# Patient Record
Sex: MALE | Race: WHITE | HISPANIC OR LATINO | Employment: FULL TIME | URBAN - NONMETROPOLITAN AREA
[De-identification: names, ages, dates, MRNs, and addresses within clinical notes are randomized per-mention and may not be internally consistent; named-entity substitution may affect disease eponyms.]

---

## 2023-09-27 ENCOUNTER — OFFICE VISIT (OUTPATIENT)
Dept: MEDICAL GROUP | Facility: PHYSICIAN GROUP | Age: 28
End: 2023-09-27
Payer: COMMERCIAL

## 2023-09-27 VITALS
DIASTOLIC BLOOD PRESSURE: 78 MMHG | SYSTOLIC BLOOD PRESSURE: 116 MMHG | BODY MASS INDEX: 29.98 KG/M2 | WEIGHT: 209.44 LBS | TEMPERATURE: 97.5 F | OXYGEN SATURATION: 95 % | HEIGHT: 70 IN | RESPIRATION RATE: 12 BRPM | HEART RATE: 77 BPM

## 2023-09-27 DIAGNOSIS — Z23 NEED FOR VACCINATION: ICD-10-CM

## 2023-09-27 DIAGNOSIS — Z11.59 NEED FOR HEPATITIS C SCREENING TEST: ICD-10-CM

## 2023-09-27 DIAGNOSIS — Z00.00 ANNUAL PHYSICAL EXAM: ICD-10-CM

## 2023-09-27 DIAGNOSIS — Z11.59 NEED FOR HEPATITIS B SCREENING TEST: ICD-10-CM

## 2023-09-27 DIAGNOSIS — R82.998 DARK BROWN URINE: ICD-10-CM

## 2023-09-27 PROCEDURE — 3074F SYST BP LT 130 MM HG: CPT | Performed by: STUDENT IN AN ORGANIZED HEALTH CARE EDUCATION/TRAINING PROGRAM

## 2023-09-27 PROCEDURE — 3078F DIAST BP <80 MM HG: CPT | Performed by: STUDENT IN AN ORGANIZED HEALTH CARE EDUCATION/TRAINING PROGRAM

## 2023-09-27 PROCEDURE — 90686 IIV4 VACC NO PRSV 0.5 ML IM: CPT | Performed by: STUDENT IN AN ORGANIZED HEALTH CARE EDUCATION/TRAINING PROGRAM

## 2023-09-27 PROCEDURE — 99385 PREV VISIT NEW AGE 18-39: CPT | Mod: 25 | Performed by: STUDENT IN AN ORGANIZED HEALTH CARE EDUCATION/TRAINING PROGRAM

## 2023-09-27 PROCEDURE — 90471 IMMUNIZATION ADMIN: CPT | Performed by: STUDENT IN AN ORGANIZED HEALTH CARE EDUCATION/TRAINING PROGRAM

## 2023-09-27 ASSESSMENT — ENCOUNTER SYMPTOMS
DIZZINESS: 0
CHILLS: 0
VOMITING: 0
PALPITATIONS: 0
HEADACHES: 0
COUGH: 0
FEVER: 0
ABDOMINAL PAIN: 0
ORTHOPNEA: 0
FOCAL WEAKNESS: 0
NAUSEA: 0
SHORTNESS OF BREATH: 0
WHEEZING: 0

## 2023-09-27 ASSESSMENT — PATIENT HEALTH QUESTIONNAIRE - PHQ9: CLINICAL INTERPRETATION OF PHQ2 SCORE: 0

## 2023-09-27 NOTE — PROGRESS NOTES
Subjective:   HISTORY OF THE PRESENT ILLNESS: Patient is a 28 y.o. male here today to establish care.    Problem   Dark Brown Urine    Thony is here to establish care.  He has no concerns today.  On review of systems reports that he has been having some dark brown urine occasionally.  Denies any blood or pinkish tint.  Reports that he does drink lot of water and yet still has this.  Reports never having history of nephrolithiasis.  Denies any dysuria, fevers, flank pain, abdominal pain nausea vomiting.            Review of systems:  Review of Systems   Constitutional:  Negative for chills and fever.   Respiratory:  Negative for cough, shortness of breath and wheezing.    Cardiovascular:  Negative for chest pain, palpitations, orthopnea and leg swelling.   Gastrointestinal:  Negative for abdominal pain, nausea and vomiting.   Genitourinary:  Negative for dysuria, frequency and hematuria.   Musculoskeletal:  Negative for joint pain.   Neurological:  Negative for dizziness, focal weakness and headaches.         Patient Active Problem List    Diagnosis Date Noted    Dark brown urine 09/27/2023     History reviewed. No pertinent surgical history.  Social History     Tobacco Use    Smoking status: Never    Smokeless tobacco: Never   Vaping Use    Vaping Use: Never used   Substance Use Topics    Alcohol use: Yes     Alcohol/week: 9.0 oz     Types: 15 Cans of beer per week    Drug use: Yes     Types: Marijuana, Inhaled      Family History   Problem Relation Age of Onset    Hyperlipidemia Mother     Cancer Father         prostate cancer, skin cancer, kidney cancer    No Known Problems Sister     Cancer Brother 26        testicular cancer     No current outpatient medications on file.     No current facility-administered medications for this visit.       Allergies:  Allergies   Allergen Reactions    Seasonal        Allergies, past medical history, past surgical history, family history, social history reviewed and  "updated.    Objective:    /78   Pulse 77   Temp 36.4 °C (97.5 °F) (Temporal)   Resp 12   Ht 1.778 m (5' 10\")   Wt 95 kg (209 lb 7 oz)   SpO2 95%   BMI 30.05 kg/m²    Body mass index is 30.05 kg/m².    Physical exam:  General: Normal appearance, no acute distress, not ill-appearing  HEENT: EOM intact, conjunctiva normal limits, negative right/left eye discharge.  Sclera anicteric, TM within normal limits bilaterally  Cardiovascular: Normal rate and rhythm, no murmurs  Pulmonary: No respiratory distress, no wheezing, no rales, breath sounds normal.  Abdomen: Bowel sounds normal, flat, soft.  Musculoskeletal: No edema bilaterally  Skin: Warm, dry, no lesions  Neurological: No focal deficits, normal gait  Psychiatric: Mood within normal limits    Assessment/Plan:    Patient here for a preventive medicine visit today and to establish care.  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  -Cholesterol screening: Ordered  -Diabetes screening: Fasting glucose ordered    Immunizations/Infectious disease:  STI screening: declines  Safe sex practices discusssed  HIV screening: declines  Immunizations: Flu shot today    Cancer screenings:  Colorectal cancer screening: No family history      Problem List Items Addressed This Visit       Dark brown urine     Urinalysis ordered         Relevant Orders    URINALYSIS     Other Visit Diagnoses       Annual physical exam        Relevant Orders    Comp Metabolic Panel    CBC WITHOUT DIFFERENTIAL    Lipid Profile    Need for hepatitis C screening test        Relevant Orders    HEP C VIRUS ANTIBODY    Need for hepatitis B screening test        Relevant Orders    HEP B SURFACE AB    Need for vaccination        Relevant Orders    INFLUENZA VACCINE QUAD INJ " (PF) (Completed)           Patient Counseling:  --Discussed moderation in caloric intake, sufficient fresh fruits/vegetables, fiber, iron  --Discussed brushing, flossing, and dental visits.   --Encouraged regular exercise.   --Discussed tobacco, alcohol, or other drug use.  --Discussed sexually transmitted infections, partner selection, use of condoms  --Injury prevention: Discussed     Preventive visit in 1 year, sooner as needed for any concerns.     Return in about 1 year (around 9/27/2024), or if symptoms worsen or fail to improve, for labs.

## 2023-10-13 ENCOUNTER — OFFICE VISIT (OUTPATIENT)
Dept: MEDICAL GROUP | Facility: PHYSICIAN GROUP | Age: 28
End: 2023-10-13
Payer: COMMERCIAL

## 2023-10-13 VITALS
SYSTOLIC BLOOD PRESSURE: 104 MMHG | HEART RATE: 72 BPM | HEIGHT: 70 IN | BODY MASS INDEX: 29.83 KG/M2 | RESPIRATION RATE: 16 BRPM | DIASTOLIC BLOOD PRESSURE: 80 MMHG | OXYGEN SATURATION: 97 % | TEMPERATURE: 97.5 F | WEIGHT: 208.34 LBS

## 2023-10-13 DIAGNOSIS — R82.998 DARK BROWN URINE: ICD-10-CM

## 2023-10-13 DIAGNOSIS — R74.8 ELEVATED LIVER ENZYMES: ICD-10-CM

## 2023-10-13 DIAGNOSIS — E78.01 FAMILIAL HYPERCHOLESTEROLEMIA: ICD-10-CM

## 2023-10-13 PROCEDURE — 3074F SYST BP LT 130 MM HG: CPT | Performed by: STUDENT IN AN ORGANIZED HEALTH CARE EDUCATION/TRAINING PROGRAM

## 2023-10-13 PROCEDURE — 99214 OFFICE O/P EST MOD 30 MIN: CPT | Performed by: STUDENT IN AN ORGANIZED HEALTH CARE EDUCATION/TRAINING PROGRAM

## 2023-10-13 PROCEDURE — 3079F DIAST BP 80-89 MM HG: CPT | Performed by: STUDENT IN AN ORGANIZED HEALTH CARE EDUCATION/TRAINING PROGRAM

## 2023-10-13 RX ORDER — ATORVASTATIN CALCIUM 10 MG/1
10 TABLET, FILM COATED ORAL NIGHTLY
Qty: 90 TABLET | Refills: 2 | Status: SHIPPED | OUTPATIENT
Start: 2023-10-13

## 2023-10-13 ASSESSMENT — ENCOUNTER SYMPTOMS
ORTHOPNEA: 0
CONSTIPATION: 0
CHILLS: 0
FLANK PAIN: 0
DIARRHEA: 0
FEVER: 0
HEARTBURN: 0
VOMITING: 0
PALPITATIONS: 0
COUGH: 0
HEADACHES: 0
ABDOMINAL PAIN: 0
NAUSEA: 0
FOCAL WEAKNESS: 0
SHORTNESS OF BREATH: 0
DIZZINESS: 0
BLOOD IN STOOL: 0
WHEEZING: 0

## 2023-10-13 NOTE — PROGRESS NOTES
HISTORY OF PRESENT ILLNESS: Thony is a pleasant 28 y.o. male, established patient who presents today to discuss medical problems as listed below:    Problem   Familial Hypercholesterolemia    Patient here to review labs.  Particularly his lipid panel.  He does have a strong family history of hyperlipidemia on his mother side.  Denies any chest pain, shortness of breath, dizziness, headaches.  Reports that he eats well for the most part and tries to exercise weekly.     Elevated Liver Enzymes   Dark Brown Urine        Current Outpatient Medications Ordered in Epic   Medication Sig Dispense Refill    atorvastatin (LIPITOR) 10 MG Tab Take 1 Tablet by mouth every evening. 90 Tablet 2     No current Epic-ordered facility-administered medications on file.       Review of systems:  Review of Systems   Constitutional:  Negative for chills and fever.   Respiratory:  Negative for cough, shortness of breath and wheezing.    Cardiovascular:  Negative for chest pain, palpitations, orthopnea and leg swelling.   Gastrointestinal:  Negative for abdominal pain, blood in stool, constipation, diarrhea, heartburn, melena, nausea and vomiting.   Genitourinary:  Negative for dysuria, flank pain, frequency, hematuria and urgency.   Musculoskeletal:  Negative for joint pain.   Neurological:  Negative for dizziness, focal weakness and headaches.           Patient Active Problem List    Diagnosis Date Noted    Familial hypercholesterolemia 10/13/2023    Elevated liver enzymes 10/13/2023    Dark brown urine 09/27/2023     History reviewed. No pertinent surgical history.  Social History     Tobacco Use    Smoking status: Never    Smokeless tobacco: Never   Vaping Use    Vaping Use: Never used   Substance Use Topics    Alcohol use: Yes     Alcohol/week: 9.0 oz     Types: 15 Cans of beer per week    Drug use: Yes     Types: Marijuana, Inhaled      Family History   Problem Relation Age of Onset    Hyperlipidemia Mother     Cancer Father         " prostate cancer, skin cancer, kidney cancer    No Known Problems Sister     Cancer Brother 26        testicular cancer     Current Outpatient Medications   Medication Sig Dispense Refill    atorvastatin (LIPITOR) 10 MG Tab Take 1 Tablet by mouth every evening. 90 Tablet 2     No current facility-administered medications for this visit.       Allergies:  Allergies   Allergen Reactions    Seasonal        Allergies, past medical history, past surgical history, family history, social history reviewed and updated.    Objective:    /80 (BP Location: Left arm, Patient Position: Sitting, BP Cuff Size: Adult)   Pulse 72   Temp 36.4 °C (97.5 °F) (Temporal)   Resp 16   Ht 1.778 m (5' 10\")   Wt 94.5 kg (208 lb 5.4 oz)   SpO2 97%   BMI 29.89 kg/m²    Body mass index is 29.89 kg/m².    Physical exam:  General: Normal appearance, no acute distress, not ill-appearing  HEENT: EOM intact, conjunctiva normal limits, negative right/left eye discharge.  Sclera anicteric  Cardiovascular: Normal rate and rhythm, no murmurs  Pulmonary: No respiratory distress, no wheezing, no rales, breath sounds normal.  Musculoskeletal: No edema bilaterally  Abd: no tenderness no masses  Skin: Warm, dry, no lesions  Neurological: No focal deficits, normal gait  Psychiatric: Mood within normal limits    Assessment/Plan:    Problem List Items Addressed This Visit       Dark brown urine     Urinalysis was clear yellow, no hematuria, no proteinuria no signs of infection.         Familial hypercholesterolemia     LDL greater than 190, suspect familial hypercholesterolemia.  We will order this testing for this.  start him on atorvastatin 10 mg daily.  Return to care 6 months for repeat lipid panel         Relevant Medications    atorvastatin (LIPITOR) 10 MG Tab    Other Relevant Orders    Referral to Genetic Research Studies    Lipoprotein (a)    Elevated liver enzymes     Elevated liver enzymes    Reorder with hep b in one month. Hep C " neg  Consider ruq if still elevated. He does minimal etoh use.          Relevant Orders    HEP B SURFACE ANTIGEN    Comp Metabolic Panel        Return in about 6 months (around 4/13/2024) for labs.

## 2023-10-13 NOTE — ASSESSMENT & PLAN NOTE
Elevated liver enzymes    Reorder with hep b in one month. Hep C neg  Consider ruq if still elevated. He does minimal etoh use.

## 2023-10-13 NOTE — ASSESSMENT & PLAN NOTE
LDL greater than 190, suspect familial hypercholesterolemia.  We will order this testing for this.  start him on atorvastatin 10 mg daily.  Return to care 6 months for repeat lipid panel

## 2023-10-18 ENCOUNTER — RESEARCH ENCOUNTER (OUTPATIENT)
Dept: RESEARCH | Facility: MEDICAL CENTER | Age: 28
End: 2023-10-18
Attending: STUDENT IN AN ORGANIZED HEALTH CARE EDUCATION/TRAINING PROGRAM
Payer: COMMERCIAL

## 2023-10-18 DIAGNOSIS — Z00.6 RESEARCH STUDY PATIENT: ICD-10-CM

## 2023-10-18 NOTE — RESEARCH NOTE
Confirmed with the participant which designated provider they would like study results shared with. Patient will have an opportunity to share the results with any providers of their choosing in the future by accessing their results from [facility].

## 2023-10-20 ENCOUNTER — HOSPITAL ENCOUNTER (OUTPATIENT)
Dept: LAB | Facility: MEDICAL CENTER | Age: 28
End: 2023-10-20
Attending: STUDENT IN AN ORGANIZED HEALTH CARE EDUCATION/TRAINING PROGRAM
Payer: COMMERCIAL

## 2023-10-20 DIAGNOSIS — Z00.6 RESEARCH STUDY PATIENT: ICD-10-CM

## 2023-11-26 LAB
APOB+LDLR+PCSK9 GENE MUT ANL BLD/T: NOT DETECTED
BRCA1+BRCA2 DEL+DUP + FULL MUT ANL BLD/T: NOT DETECTED
MLH1+MSH2+MSH6+PMS2 GN DEL+DUP+FUL M: NOT DETECTED

## 2023-12-10 ENCOUNTER — OFFICE VISIT (OUTPATIENT)
Dept: URGENT CARE | Facility: CLINIC | Age: 28
End: 2023-12-10
Payer: COMMERCIAL

## 2023-12-10 ENCOUNTER — APPOINTMENT (OUTPATIENT)
Dept: RADIOLOGY | Facility: IMAGING CENTER | Age: 28
End: 2023-12-10
Attending: NURSE PRACTITIONER
Payer: COMMERCIAL

## 2023-12-10 VITALS
SYSTOLIC BLOOD PRESSURE: 110 MMHG | HEART RATE: 80 BPM | BODY MASS INDEX: 29.78 KG/M2 | OXYGEN SATURATION: 97 % | RESPIRATION RATE: 16 BRPM | WEIGHT: 208 LBS | TEMPERATURE: 98.5 F | DIASTOLIC BLOOD PRESSURE: 84 MMHG | HEIGHT: 70 IN

## 2023-12-10 DIAGNOSIS — M89.8X1 PAIN OF LEFT CLAVICLE: ICD-10-CM
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
## 2023-12-10 PROCEDURE — 73000 X-RAY EXAM OF COLLAR BONE: CPT | Mod: TC,LT | Performed by: NURSE PRACTITIONER

## 2023-12-10 PROCEDURE — 3074F SYST BP LT 130 MM HG: CPT | Performed by: NURSE PRACTITIONER

## 2023-12-10 PROCEDURE — 3079F DIAST BP 80-89 MM HG: CPT | Performed by: NURSE PRACTITIONER

## 2023-12-10 PROCEDURE — 99213 OFFICE O/P EST LOW 20 MIN: CPT | Performed by: NURSE PRACTITIONER

## 2023-12-10 ASSESSMENT — ENCOUNTER SYMPTOMS
CARDIOVASCULAR NEGATIVE: 1
CONSTITUTIONAL NEGATIVE: 1
EYES NEGATIVE: 1
NEUROLOGICAL NEGATIVE: 1
RESPIRATORY NEGATIVE: 1
GASTROINTESTINAL NEGATIVE: 1
TINGLING: 0
MUSCLE WEAKNESS: 0
NUMBNESS: 0

## 2023-12-11 ENCOUNTER — OFFICE VISIT (OUTPATIENT)
Dept: URGENT CARE | Facility: CLINIC | Age: 28
End: 2023-12-11
Payer: COMMERCIAL

## 2023-12-11 VITALS
HEART RATE: 79 BPM | RESPIRATION RATE: 16 BRPM | OXYGEN SATURATION: 99 % | HEIGHT: 70 IN | TEMPERATURE: 98 F | SYSTOLIC BLOOD PRESSURE: 128 MMHG | BODY MASS INDEX: 29.78 KG/M2 | WEIGHT: 208 LBS | DIASTOLIC BLOOD PRESSURE: 80 MMHG

## 2023-12-11 DIAGNOSIS — S46.009A ROTATOR CUFF INJURY, INITIAL ENCOUNTER: ICD-10-CM

## 2023-12-11 PROCEDURE — 99213 OFFICE O/P EST LOW 20 MIN: CPT | Performed by: REGISTERED NURSE

## 2023-12-11 PROCEDURE — 3074F SYST BP LT 130 MM HG: CPT | Performed by: REGISTERED NURSE

## 2023-12-11 PROCEDURE — 3079F DIAST BP 80-89 MM HG: CPT | Performed by: REGISTERED NURSE

## 2023-12-11 RX ORDER — KETOROLAC TROMETHAMINE 30 MG/ML
30 INJECTION, SOLUTION INTRAMUSCULAR; INTRAVENOUS ONCE
Status: DISCONTINUED | OUTPATIENT
Start: 2023-12-11 | End: 2023-12-11

## 2023-12-11 RX ORDER — KETOROLAC TROMETHAMINE 30 MG/ML
30 INJECTION, SOLUTION INTRAMUSCULAR; INTRAVENOUS ONCE
Status: COMPLETED | OUTPATIENT
Start: 2023-12-11 | End: 2023-12-11

## 2023-12-11 RX ADMIN — KETOROLAC TROMETHAMINE 30 MG: 30 INJECTION, SOLUTION INTRAMUSCULAR; INTRAVENOUS at 13:53

## 2023-12-11 NOTE — PROGRESS NOTES
"Subjective:   Thony Medley is a 28 y.o. male who presents for Shoulder Injury (L shoulder, collar bone, pt states was snow boarding, fell x today )      Patient presents for evaluation of left clavicle pain s/p fall while snowboarding earlier today.  He states that he did hear a \"pop\" and then had onset of pain in the left clavicle. He states that he has worsening pain with any movement of his left arm.  He describes the pain as aching in quality and it is constant. He did apply ice and is keeping his left arm immobile to help with the pain.  He denies any numbness or tingling. He denies any radiation of the pain anywhere else.     Shoulder Injury   Incident location: [place] while snowboarding. The incident occurred 3 to 6 hours ago. The injury mechanism was a fall. The quality of the pain is described as aching and stabbing. Radiates to: Left clavicle. The pain is at a severity of 6/10. The pain is moderate. Pertinent negatives include no chest pain, muscle weakness, numbness or tingling. The symptoms are aggravated by movement, overhead lifting and palpation. He has tried ice and immobilization for the symptoms. The treatment provided mild relief.       Review of Systems   Constitutional: Negative.    HENT: Negative.     Eyes: Negative.    Respiratory: Negative.     Cardiovascular: Negative.  Negative for chest pain.   Gastrointestinal: Negative.    Genitourinary: Negative.    Musculoskeletal:         Left clavicle pain   Skin: Negative.    Neurological: Negative.  Negative for tingling and numbness.       Medications, Allergies, and current problem list reviewed today in Epic.     Objective:     /84 (BP Location: Right arm, Patient Position: Sitting, BP Cuff Size: Adult)   Pulse 80   Temp 36.9 °C (98.5 °F) (Temporal)   Resp 16   Ht 1.778 m (5' 10\")   Wt 94.3 kg (208 lb)   SpO2 97%     Physical Exam  Vitals reviewed.   Constitutional:       Appearance: Normal appearance.   HENT:      Head: " Normocephalic and atraumatic.      Nose: Nose normal.      Mouth/Throat:      Mouth: Mucous membranes are moist.      Pharynx: Oropharynx is clear.   Eyes:      Extraocular Movements: Extraocular movements intact.      Conjunctiva/sclera: Conjunctivae normal.      Pupils: Pupils are equal, round, and reactive to light.   Cardiovascular:      Rate and Rhythm: Normal rate and regular rhythm.      Pulses: Normal pulses.      Heart sounds: Normal heart sounds.   Pulmonary:      Effort: Pulmonary effort is normal.      Breath sounds: Normal breath sounds.   Chest:          Comments: There is pain to palpation over the left clavicle.  Patient cannot move his left arm due to pain.  There is no erythema, edema or ecchymosis.  There is no deformity palpable.    Abdominal:      General: Abdomen is flat. Bowel sounds are normal.      Palpations: Abdomen is soft.   Musculoskeletal:         General: Normal range of motion.      Cervical back: Normal range of motion and neck supple.   Skin:     General: Skin is warm and dry.      Capillary Refill: Capillary refill takes less than 2 seconds.   Neurological:      General: No focal deficit present.      Mental Status: He is alert and oriented to person, place, and time.   Psychiatric:         Mood and Affect: Mood normal.         Behavior: Behavior normal.         RADIOLOGY RESULTS   DX-CLAVICLE LEFT    Result Date: 12/10/2023  12/10/2023 1:56 PM HISTORY/REASON FOR EXAM:  Pain following trauma. TECHNIQUE/EXAM DESCRIPTION AND NUMBER OF VIEWS:  2 views of the LEFT clavicle. COMPARISON: None FINDINGS: BONE MINERALIZATION: Normal. JOINTS: Preserved. No erosions. FRACTURE: None. DISLOCATION: None. SOFT TISSUES: No mass.     No acute osseous abnormality.             Assessment/Plan:     Diagnosis and associated orders:     1. Snowboard accident, initial encounter  DX-CLAVICLE LEFT      2. Pain of left clavicle  DX-CLAVICLE LEFT         Comments/MDM:     Patient was reassured that at this  time his x-rays do not reveal any fracture or dislocation.  Due to his pain level, he was placed in a sling for comfort today and referred to orthopedics.  He will apply ice to the area and use antiinflammatories for pain.  He will return to clinic for any worsening symptoms, but otherwise will follow up with orthopedics  at the first available appointment. Patient verbalizes understanding and is in agreement with the treatment plan.         Differential diagnosis, natural history, supportive care, and indications for immediate follow-up discussed.    Advised the patient to follow-up with the primary care physician for recheck, reevaluation, and consideration of further management.    Please note that this dictation was created using voice recognition software. I have made a reasonable attempt to correct obvious errors, but I expect that there are errors of grammar and possibly content that I did not discover before finalizing the note.    This note was electronically signed by DIONY Mejia

## 2023-12-11 NOTE — PROGRESS NOTES
"Subjective:   Thony Medlye is a 28 y.o. male who presents for Shoulder Injury (Unable to lifted, x 1 day )    HPI  Was seen in clinic yesterday for left shoulder injury, reports worsening pain difficulty with lateral abduction.  No numbness or tingling.  Did have negative imaging yesterday.  Not using OTC medications.  Does have referral for orthopedics but wondering if it can get pushed forward.  No new trauma or injury.    ROS: Negative unless mentioned in HPI    Allergies   Allergen Reactions    Seasonal        Patient Active Problem List    Diagnosis Date Noted    Familial hypercholesterolemia 10/13/2023    Elevated liver enzymes 10/13/2023    Dark brown urine 09/27/2023       Current Outpatient Medications Ordered in Epic   Medication Sig Dispense Refill    atorvastatin (LIPITOR) 10 MG Tab Take 1 Tablet by mouth every evening. (Patient not taking: Reported on 12/10/2023) 90 Tablet 2     Current Facility-Administered Medications Ordered in Epic   Medication Dose Route Frequency Provider Last Rate Last Admin    ketorolac (Toradol) injection 30 mg  30 mg Intramuscular Once Kavon Reyna, A.P.R.N.           No past surgical history on file.    Social History     Tobacco Use    Smoking status: Never    Smokeless tobacco: Never   Vaping Use    Vaping Use: Never used   Substance Use Topics    Alcohol use: Yes     Alcohol/week: 9.0 oz     Types: 15 Cans of beer per week    Drug use: Yes     Types: Marijuana, Inhaled       family history includes Cancer in his father; Cancer (age of onset: 26) in his brother; Hyperlipidemia in his mother; No Known Problems in his sister.     Problem list, medications, and allergies reviewed by myself today in Epic.     Objective:   /80 (BP Location: Right arm, Patient Position: Sitting, BP Cuff Size: Adult)   Pulse 79   Temp 36.7 °C (98 °F) (Temporal)   Resp 16   Ht 1.778 m (5' 10\")   Wt 94.3 kg (208 lb)   SpO2 99%   BMI 29.84 kg/m²     Physical Exam  Vitals and nursing " note reviewed.   Constitutional:       Appearance: Normal appearance. He is not ill-appearing or toxic-appearing.   HENT:      Mouth/Throat:      Mouth: Mucous membranes are moist.   Eyes:      Pupils: Pupils are equal, round, and reactive to light.   Cardiovascular:      Rate and Rhythm: Normal rate and regular rhythm.      Heart sounds: No murmur heard.  Pulmonary:      Effort: Pulmonary effort is normal. No respiratory distress.      Breath sounds: Normal breath sounds.   Musculoskeletal:      Left shoulder: No swelling, deformity or bony tenderness. Decreased range of motion. Decreased strength.      Cervical back: Normal range of motion.   Skin:     General: Skin is warm and dry.      Capillary Refill: Capillary refill takes less than 2 seconds.      Findings: No rash.   Neurological:      General: No focal deficit present.      Mental Status: He is alert and oriented to person, place, and time.      Cranial Nerves: No cranial nerve deficit.   Psychiatric:         Mood and Affect: Mood normal.         Assessment/Plan:       1. Rotator cuff injury, initial encounter  Seen in clinic yesterday for shoulder injury, reporting decreased strength with certain movements and inability to perform lateral abduction.  Not using OTCs.  Will place urgent referral to Ortho to get him seen quicker.  AAOS shoulder PDF provided to patient.  30 mg Toradol in clinic.   - Referral to Orthopedics  - ketorolac (Toradol) injection 30 mg      Differential diagnosis discussed     Return to clinic or go to ED if symptoms worsen or persist. Indications for ED discussed at length. Patient/Parent/Guardian voices understanding.     Follow-up with your PCP and Ortho ASAP    Red flag symptoms discussed. All side effects of medication discussed including allergic response, GI upset, tendon injury, rash, sedation etc.    I personally reviewed prior external notes and test results pertinent to today's visit as well as additional imaging and  testing completed in clinic today.     Please note that this dictation was created using voice recognition software. I have made every reasonable attempt to correct obvious errors, but I expect that there are errors of grammar and possibly content that I did not discover before finalizing the note.    This note was electronically signed by GRETCHEN Julian

## 2024-04-16 ENCOUNTER — OFFICE VISIT (OUTPATIENT)
Dept: MEDICAL GROUP | Facility: PHYSICIAN GROUP | Age: 29
End: 2024-04-16
Payer: COMMERCIAL

## 2024-04-16 VITALS
HEART RATE: 62 BPM | WEIGHT: 207.89 LBS | HEIGHT: 70 IN | OXYGEN SATURATION: 98 % | BODY MASS INDEX: 29.76 KG/M2 | SYSTOLIC BLOOD PRESSURE: 110 MMHG | TEMPERATURE: 98.1 F | DIASTOLIC BLOOD PRESSURE: 80 MMHG | RESPIRATION RATE: 16 BRPM

## 2024-04-16 DIAGNOSIS — E78.01 FAMILIAL HYPERCHOLESTEROLEMIA: ICD-10-CM

## 2024-04-16 DIAGNOSIS — R74.8 ELEVATED LIVER ENZYMES: ICD-10-CM

## 2024-04-16 PROCEDURE — 3079F DIAST BP 80-89 MM HG: CPT | Performed by: STUDENT IN AN ORGANIZED HEALTH CARE EDUCATION/TRAINING PROGRAM

## 2024-04-16 PROCEDURE — 99214 OFFICE O/P EST MOD 30 MIN: CPT | Performed by: STUDENT IN AN ORGANIZED HEALTH CARE EDUCATION/TRAINING PROGRAM

## 2024-04-16 PROCEDURE — 3074F SYST BP LT 130 MM HG: CPT | Performed by: STUDENT IN AN ORGANIZED HEALTH CARE EDUCATION/TRAINING PROGRAM

## 2024-04-16 ASSESSMENT — PATIENT HEALTH QUESTIONNAIRE - PHQ9: CLINICAL INTERPRETATION OF PHQ2 SCORE: 0

## 2024-04-16 NOTE — PROGRESS NOTES
Verbal Consent given for [employer] recording software    HISTORY OF PRESENT ILLNESS: Thony is a pleasant 29 y.o. male, established patient who presents today to discuss medical problems as listed below:    History of Present Illness  The patient is a 29-year-old male here for follow-up. At the last visit, he had an elevated LDL of 201 and we thought it was possible familial hypercholesterolemia, so we sent him for a genetic testing done with our genetic research department and it came back that there was no evidence of familial hypercholesterolemia.    The patient has made dietary modifications since 01/2024, including the elimination of red meat and whole dairy from his diet. His diet primarily consists of plant-based red meat, with occasional red meat consumption occurring approximately once a month. His diet also includes chicken, salmon, and fish. He initially took atorvastatin for a few weeks, but discontinued it due to side effects, including body aches and lower back pain. He maintains an active lifestyle, engaging in sports such as hockey during winter and snowboarding every weekend. He has reduced his alcohol consumption, primarily during weekends, and is planning to have his first child. His dinner last night consisted of a plant-based meat with pickled cucumber and radishes on rice. His lunch yesterday consisted of a peanut butter and jelly sandwich. He denies experiencing chest pain or shortness of breath and reports feeling well overall.    The patient reports experiencing back soreness, which he believes is stress-induced. He describes the presence of knots in his back and right shoulder, which have been present for the past 3 weeks. He recalls a similar issue prior to his wedding. He has been self-medicating with magnesium and fish oil to relax his muscles. He has been increasing his water intake but does not engage in stretching exercises.       Current Outpatient Medications Ordered in Epic   Medication  "Sig Dispense Refill    atorvastatin (LIPITOR) 10 MG Tab Take 1 Tablet by mouth every evening. (Patient not taking: Reported on 12/10/2023) 90 Tablet 2     No current Epic-ordered facility-administered medications on file.       Review of systems:  Per HPI    Patient Active Problem List    Diagnosis Date Noted    Familial hypercholesterolemia 10/13/2023    Elevated liver enzymes 10/13/2023    Dark brown urine 09/27/2023     History reviewed. No pertinent surgical history.  Social History     Tobacco Use    Smoking status: Never    Smokeless tobacco: Never   Vaping Use    Vaping Use: Never used   Substance Use Topics    Alcohol use: Yes     Alcohol/week: 9.0 oz     Types: 15 Cans of beer per week    Drug use: Yes     Types: Marijuana, Inhaled      Family History   Problem Relation Age of Onset    Hyperlipidemia Mother     Cancer Father         prostate cancer, skin cancer, kidney cancer    No Known Problems Sister     Cancer Brother 26        testicular cancer     Current Outpatient Medications   Medication Sig Dispense Refill    atorvastatin (LIPITOR) 10 MG Tab Take 1 Tablet by mouth every evening. (Patient not taking: Reported on 12/10/2023) 90 Tablet 2     No current facility-administered medications for this visit.       Allergies:  Allergies   Allergen Reactions    Seasonal        Allergies, past medical history, past surgical history, family history, social history reviewed and updated.    Objective:    /80   Pulse 62   Temp 36.7 °C (98.1 °F) (Temporal)   Resp 16   Ht 1.778 m (5' 10\")   Wt 94.3 kg (207 lb 14.3 oz)   SpO2 98%   BMI 29.83 kg/m²    Body mass index is 29.83 kg/m².    Physical exam:  General: Normal appearance, no acute distress, not ill-appearing  HEENT: EOM intact, conjunctiva normal limits, negative right/left eye discharge.  Sclera anicteric  Cardiovascular: Normal rate and rhythm, no murmurs  Pulmonary: No respiratory distress, no wheezing, no rales, breath sounds " normal.  Musculoskeletal: No edema bilaterally  Skin: Warm, dry, no lesions  Neurological: No focal deficits, normal gait  Psychiatric: Mood within normal limits    Assessment/Plan:    Assessment & Plan  1. Hypercholesterolemia.  Genetic testing reviewed, neg for FH.   Laboratory tests will be conducted to monitor the patient's cholesterol levels and liver enzymes. Dietary modifications were recommended, including the avoidance of high sugars, carbs, trans fats, seed oils, and processed foods. Should the patient's cholesterol levels remain elevated, alternative medication options will be considered. Had muscle aches to atorvastatin    2. Back soreness.  The patient was advised to apply a heating pad, roll the area with a tennis ball, and engage in massage techniques. Should the patient's condition deteriorate, a follow-up appointment will be scheduled.    3. Elevcated liver enzymes  Repeat with hep b SAG  Ferritin, iron, aq1 ordered  If still eelvated consider ruq us       Problem List Items Addressed This Visit       Familial hypercholesterolemia    Relevant Orders    Lipid Profile    HEMOGLOBIN A1C    TSH WITH REFLEX TO FT4    Elevated liver enzymes    Relevant Orders    Comp Metabolic Panel    Lipid Profile    HEP B SURFACE ANTIGEN    HEMOGLOBIN A1C    FERRITIN    IRON/TOTAL IRON BIND    TSH WITH REFLEX TO FT4       Return in about 6 months (around 10/16/2024), or if symptoms worsen or fail to improve, for labs.

## 2024-04-24 DIAGNOSIS — R74.8 ELEVATED LIVER ENZYMES: ICD-10-CM

## 2024-04-30 ENCOUNTER — OFFICE VISIT (OUTPATIENT)
Dept: MEDICAL GROUP | Facility: PHYSICIAN GROUP | Age: 29
End: 2024-04-30
Payer: COMMERCIAL

## 2024-04-30 VITALS
SYSTOLIC BLOOD PRESSURE: 118 MMHG | TEMPERATURE: 96.5 F | HEIGHT: 70 IN | HEART RATE: 69 BPM | DIASTOLIC BLOOD PRESSURE: 84 MMHG | RESPIRATION RATE: 16 BRPM | OXYGEN SATURATION: 97 % | WEIGHT: 209.22 LBS | BODY MASS INDEX: 29.95 KG/M2

## 2024-04-30 DIAGNOSIS — R74.8 ELEVATED LIVER ENZYMES: ICD-10-CM

## 2024-04-30 DIAGNOSIS — E78.01 FAMILIAL HYPERCHOLESTEROLEMIA: ICD-10-CM

## 2024-04-30 DIAGNOSIS — E78.2 MIXED HYPERLIPIDEMIA: ICD-10-CM

## 2024-04-30 RX ORDER — PRAVASTATIN SODIUM 20 MG
20 TABLET ORAL NIGHTLY
Qty: 30 TABLET | Refills: 0 | Status: SHIPPED | OUTPATIENT
Start: 2024-04-30

## 2024-04-30 ASSESSMENT — ENCOUNTER SYMPTOMS
DIZZINESS: 0
COUGH: 0
HEADACHES: 0
WHEEZING: 0
ORTHOPNEA: 0
FEVER: 0
VOMITING: 0
FOCAL WEAKNESS: 0
SHORTNESS OF BREATH: 0
NAUSEA: 0
HEARTBURN: 0
CHILLS: 0
ABDOMINAL PAIN: 0
PALPITATIONS: 0

## 2024-04-30 NOTE — PROGRESS NOTES
Verbal Consent given for [employer] recording software    HISTORY OF PRESENT ILLNESS: Thony is a pleasant 29 y.o. male, established patient who presents today to discuss medical problems as listed below:    History of Present Illness  The patient is a 29-year-old male here for a follow-up. Last time he had elevated liver enzymes and we were just repeating it. I was suspecting it was secondary to fatty liver disease as his cholesterol has been quite high.    The patient has abstained from alcohol since his previous visit. He discontinued the prescribed statin medication due to adverse effects, including body and back pain. He denies experiencing any nausea, vomiting, or abdominal pain. He maintains an active lifestyle and adheres to a healthy diet, including reducing his carbohydrate intake and reducing his soy-based meat intake. His diet primarily consists of vegetables. He denies any leg swelling.   His mother has high cholesterol.       Current Outpatient Medications Ordered in Epic   Medication Sig Dispense Refill    pravastatin (PRAVACHOL) 20 MG Tab Take 1 Tablet by mouth every evening. 30 Tablet 0     No current Epic-ordered facility-administered medications on file.       Review of systems:  Review of Systems   Constitutional:  Negative for chills and fever.   Respiratory:  Negative for cough, shortness of breath and wheezing.    Cardiovascular:  Negative for chest pain, palpitations, orthopnea and leg swelling.   Gastrointestinal:  Negative for abdominal pain, heartburn, melena, nausea and vomiting.   Genitourinary:  Negative for dysuria.   Musculoskeletal:  Negative for joint pain.   Neurological:  Negative for dizziness, focal weakness and headaches.         Patient Active Problem List    Diagnosis Date Noted    Familial hypercholesterolemia 10/13/2023    Elevated liver enzymes 10/13/2023    Dark brown urine 09/27/2023     No past surgical history on file.  Social History     Tobacco Use    Smoking status: Never  "   Smokeless tobacco: Never   Vaping Use    Vaping Use: Never used   Substance Use Topics    Alcohol use: Yes     Alcohol/week: 9.0 oz     Types: 15 Cans of beer per week    Drug use: Yes     Types: Marijuana, Inhaled      Family History   Problem Relation Age of Onset    Hyperlipidemia Mother     Cancer Father         prostate cancer, skin cancer, kidney cancer    No Known Problems Sister     Cancer Brother 26        testicular cancer     Current Outpatient Medications   Medication Sig Dispense Refill    pravastatin (PRAVACHOL) 20 MG Tab Take 1 Tablet by mouth every evening. 30 Tablet 0     No current facility-administered medications for this visit.       Allergies:  Allergies   Allergen Reactions    Seasonal        Allergies, past medical history, past surgical history, family history, social history reviewed and updated.    Objective:    /84   Pulse 69   Temp 35.8 °C (96.5 °F) (Temporal)   Resp 16   Ht 1.778 m (5' 10\")   Wt 94.9 kg (209 lb 3.5 oz)   SpO2 97%   BMI 30.02 kg/m²    Body mass index is 30.02 kg/m².    Physical exam:  General: Normal appearance, no acute distress, not ill-appearing  HEENT: EOM intact, conjunctiva normal limits, negative right/left eye discharge.  Sclera anicteric  Cardiovascular: Normal rate and rhythm, no murmurs  Pulmonary: No respiratory distress, no wheezing, no rales, breath sounds normal.  Abdomen: Bowel sounds normal, flat, soft.  Musculoskeletal: No edema bilaterally  Skin: Warm, dry, no lesions  Neurological: No focal deficits, normal gait  Psychiatric: Mood within normal limits    Labs 4/19/2024  Glucose 94 fasting  AST 45,   Total cholesterol 281, triglycerides 225, , HDL 40  Hemoglobin A1c 5.6  Iron saturation 23, ferritin 364, iron 84  Hepatitis B surface antigen negative    Assessment/Plan:    Assessment & Plan  1. Elevated liver enzymes.  2.  Hypercholesterolemia  The patient's elevated liver enzymes are likely attributable to fatty liver " disease. Despite his elevated cholesterol levels, his genetic test returned negative results for fatty liver disease.     Labs show persistent elevation of LDL at 197.  Familial hypercholesterolemia testing was negative.  Iron levels, ferritin, hep B, hep C labs were negative, hemoglobin A1c 5.6.    A liver ultrasound has been ordered. A referral to a gastroenterologist has been made for further evaluation. The patient has been advised to maintain his diet and exercise regimen.     A prescription for pravastatin, 30 tablets, has been issued. Should the patient continue to experience muscle side effects while on pravastatin, he has been advised to discontinue the medication and communicate with me. In such a case, an alternative medication will be considered.    Follow-up  The patient is scheduled for a follow-up visit in 3 months, or earlier if necessary.       Problem List Items Addressed This Visit       Familial hypercholesterolemia    Relevant Medications    pravastatin (PRAVACHOL) 20 MG Tab    Elevated liver enzymes    Relevant Orders    [facility]    Referral to Gastroenterology     Other Visit Diagnoses       Mixed hyperlipidemia        Relevant Medications    pravastatin (PRAVACHOL) 20 MG Tab            Return in about 3 months (around 7/30/2024), or if symptoms worsen or fail to improve.

## 2024-05-23 ENCOUNTER — HOSPITAL ENCOUNTER (OUTPATIENT)
Dept: RADIOLOGY | Facility: MEDICAL CENTER | Age: 29
End: 2024-05-23
Attending: STUDENT IN AN ORGANIZED HEALTH CARE EDUCATION/TRAINING PROGRAM
Payer: COMMERCIAL

## 2024-05-23 DIAGNOSIS — R74.8 ELEVATED LIVER ENZYMES: ICD-10-CM

## 2024-05-24 DIAGNOSIS — E78.2 MIXED HYPERLIPIDEMIA: ICD-10-CM

## 2024-05-24 RX ORDER — PRAVASTATIN SODIUM 20 MG
20 TABLET ORAL EVERY EVENING
Qty: 90 TABLET | Refills: 1 | Status: SHIPPED | OUTPATIENT
Start: 2024-05-24

## 2024-06-28 DIAGNOSIS — K76.0 STEATOSIS OF LIVER: ICD-10-CM

## 2024-06-28 DIAGNOSIS — E78.2 MIXED HYPERLIPIDEMIA: ICD-10-CM

## 2024-08-22 ENCOUNTER — APPOINTMENT (OUTPATIENT)
Dept: URGENT CARE | Facility: CLINIC | Age: 29
End: 2024-08-22

## 2024-08-23 PROBLEM — S62.92XA LEFT HAND FRACTURE: Status: ACTIVE | Noted: 2024-08-23
